# Patient Record
Sex: FEMALE | Race: WHITE | NOT HISPANIC OR LATINO | Employment: UNEMPLOYED | URBAN - METROPOLITAN AREA
[De-identification: names, ages, dates, MRNs, and addresses within clinical notes are randomized per-mention and may not be internally consistent; named-entity substitution may affect disease eponyms.]

---

## 2021-10-11 ENCOUNTER — TELEPHONE (OUTPATIENT)
Dept: RHEUMATOLOGY | Facility: CLINIC | Age: 56
End: 2021-10-11

## 2022-02-14 ENCOUNTER — TELEPHONE (OUTPATIENT)
Dept: RHEUMATOLOGY | Facility: CLINIC | Age: 57
End: 2022-02-14
Payer: COMMERCIAL

## 2022-02-14 NOTE — TELEPHONE ENCOUNTER
Left vm to inform Ms Rodas the 4/25/22 appointment would need to be cancelled due to being booked incorrectly. Left instructions to call [phone] to discuss the need for rheumatology appointment. Pt has no referral and isn't an transfer has far as the current chart information gives. Will wait for patient to call back to reschedule.  Xiomara WALLIS LPN

## 2022-11-01 NOTE — PROGRESS NOTES
"Subjective:       Patient ID: Yoko Rodas is a 57 y.o. female.    Chief Complaint: Abnormal Lab    HPI    This is a 57-year-old woman with history of vitamin-D deficiency, HLD, anxiety, depression, YADIEL on CPAP, GERD, IBS with diarrhea and constipation, rosacea, panic disorder, rotator cuff tendinopathy, carpal tunnel syndrome right, chronic back pain with radicular symptoms (MRI lumbar spine 2022 showed disc bulge, anterolisthesis, and disc herniation) s/p back surgery (10/17/22), and allergy to sulfa drugs who was referred to Rheumatology for positive SHEA (positive centromere and RNP). The patient reports chronic pain in her hands, specifically in bilateral CMC joints, and swelling In her fingers. She notes stiffness lasting a day to a few days. She reports that her arms frequently fall asleep, and that she gets numbness and tingling of her fingers. She reports Raynaud's that she has had  for the past year, dry eyes on Restasis and OTC eye drops, occasional dysphagia, she picks at the skin on her forearms, swelling in both legs, positive FOBT two years ago and has never had a colonoscopy, weakness in the legs worse since the back surgery.    The patient denies fevers, patchy alopecia, oral/nasal ulcers, rashes, photosensitivity, pleuritic chest pain, abdominal pain, diarrhea, sinusitis, ear infections, genital ulcers.     Gyn History:   (3003)   No VTE     Family History:  T2DM  Lung cancer  Lupus (?): mother    Social History:   Former smoker, quit in , used to smoke 1 PPD  Former alcoholic beverage drinker     Objective:   /88   Pulse 87   Ht 5' 4" (1.626 m)   Wt 96.2 kg (212 lb 1.3 oz)   BMI 36.40 kg/m²      Physical Exam   Constitutional: normal appearance.   HENT:   Head: Atraumatic.   Mouth/Throat: Mucous membranes are moist.   Mouth/Throat: No oral ulcers  Cardiovascular: Normal rate, regular rhythm and normal heart sounds.   Pulmonary/Chest: Effort normal and breath sounds " normal.   Musculoskeletal:      Comments: Ambulates with a walker  Swollen joints:  1st to 3rd MCPs bilaterally  Tender joints:  1st to 3rd MCPs bilaterally  Unable to make a full fist with both hands  Strength 5/5 , finger flexors and abductors, proximal and distal upper and lower extremities  Bilateral leg swelling, right > left   Neurological: She is alert.   Skin: Skin is warm and dry. Rash (Sores on bilateral forearms from picking skin) noted.   No psoriasiform rashes, sclerodactyly, telangiectasias, calcinosis      No data to display    Labs reviewed by me:   Positive SHEA without titer, positive centromere, positive RNP  Negative rheumatoid factor, HLA B27  10/2021  CBC:  HGB 11.9, otherwise WNL  CMP WNL  ESR 10  CRP WNL  CPK WNL  UA WNL    Assessment:       1. Inflammatory arthritis    2. Positive SHEA (antinuclear antibody)    3. Raynaud's disease without gangrene    4. Positive fecal occult blood test        This is a 57-year-old woman with history of vitamin-D deficiency, HLD, anxiety, depression, YADIEL on CPAP, GERD, IBS with diarrhea and constipation, rosacea, panic disorder, rotator cuff tendinopathy, carpal tunnel syndrome right, chronic back pain with radicular symptoms (MRI lumbar spine 01/2022 showed disc bulge, anterolisthesis, and disc herniation) s/p back surgery (10/17/22), and allergy to sulfa drugs who was referred to Rheumatology for positive SHEA (positive centromere and RNP). The patient reports pain and swelling in her hands, and stiffness lasting a day to a few days.  She also reports numbness and tingling of her fingers, Raynaud's (with pictures), dry eyes on Restasis and OTC eye drops, occasional dysphagia, swelling in both legs, positive FOBT two years ago and has never had a colonoscopy, weakness in the legs worse since the back surgery.  Physical examination is significant for bilateral 1st to 3rd MCP swelling and tenderness, inability to make a fist with both hands, and swelling of  both lower extremities.  She has evidence of inflammatory arthritis, positive centromere, RNP, and Raynaud's.  Will complete workup as outlined below.  Discussed that she will likely need to start methotrexate.  I gave her information on this medication and will discuss it further at follow-up.    Plan:       Problem List Items Addressed This Visit    None  Visit Diagnoses       Inflammatory arthritis    -  Primary    Relevant Orders    Anti-DNA Ab, Double-Stranded    C3 Complement    C4 Complement    CBC Auto Differential    Comprehensive Metabolic Panel    C-Reactive Protein    Protein/Creatinine Ratio, Urine    Sedimentation rate    Urinalysis    Rheumatoid Factor    Cyclic Citrullinated Peptide Antibody, IgG    DRVVT    Cardiolipin antibody    Beta-2 Glycoprotein Abs (IgA, IgG, IgM)    CK    Aldolase    HIV 1/2 Ag/Ab (4th Gen)    Hepatitis B surface antigen    HBcAB    Hepatitis B surface antibody    Hepatitis C antibody    Quantiferon Gold TB    Direct antiglobulin test    Complement, Total    SHEA Screen w/Reflex    Anti Sm/RNP Antibody    Sjogrens syndrome-A extractable nuclear antibody    ANTI-SSB ANTIBODY    X-Ray Hand Complete Bilateral    Positive SHEA (antinuclear antibody)        Relevant Orders    Anti-DNA Ab, Double-Stranded    C3 Complement    C4 Complement    CBC Auto Differential    Comprehensive Metabolic Panel    C-Reactive Protein    Protein/Creatinine Ratio, Urine    Sedimentation rate    Urinalysis    Rheumatoid Factor    Cyclic Citrullinated Peptide Antibody, IgG    DRVVT    Cardiolipin antibody    Beta-2 Glycoprotein Abs (IgA, IgG, IgM)    CK    Aldolase    HIV 1/2 Ag/Ab (4th Gen)    Hepatitis B surface antigen    HBcAB    Hepatitis B surface antibody    Hepatitis C antibody    Quantiferon Gold TB    Direct antiglobulin test    Complement, Total    SHEA Screen w/Reflex    Anti Sm/RNP Antibody    Sjogrens syndrome-A extractable nuclear antibody    ANTI-SSB ANTIBODY    Raynaud's disease without  gangrene        Relevant Orders    Anti-DNA Ab, Double-Stranded    C3 Complement    C4 Complement    CBC Auto Differential    Comprehensive Metabolic Panel    C-Reactive Protein    Protein/Creatinine Ratio, Urine    Sedimentation rate    Urinalysis    Rheumatoid Factor    Cyclic Citrullinated Peptide Antibody, IgG    DRVVT    Cardiolipin antibody    Beta-2 Glycoprotein Abs (IgA, IgG, IgM)    CK    Aldolase    HIV 1/2 Ag/Ab (4th Gen)    Hepatitis B surface antigen    HBcAB    Hepatitis B surface antibody    Hepatitis C antibody    Quantiferon Gold TB    Direct antiglobulin test    Complement, Total    SHEA Screen w/Reflex    Anti Sm/RNP Antibody    Sjogrens syndrome-A extractable nuclear antibody    ANTI-SSB ANTIBODY    Positive fecal occult blood test              - lab workup as outlined above  - x-rays of both hands  - discussed central heat preservation as conservative management for Raynaud's; may need to try CCB in the future  - ACR patient information sheet on methotrexate was provided  - patient needs a colonoscopy.  She had a positive FOBT 2 years ago that has never been evaluated.    Follow up in 3-4 weeks to discuss management    60 minutes of total time spent on the encounter, which includes face to face time and non-face to face time preparing to see the patient (eg, review of tests), Obtaining and/or reviewing separately obtained history, Documenting clinical information in the electronic or other health record, Independently interpreting results (not separately reported) and communicating results to the patient/family/caregiver, or Care coordination (not separately reported).       Karena Tapia M.D.  Rheumatology Dept  [place], [place]

## 2022-11-02 ENCOUNTER — OFFICE VISIT (OUTPATIENT)
Dept: RHEUMATOLOGY | Facility: CLINIC | Age: 57
End: 2022-11-02
Payer: MEDICAID

## 2022-11-02 ENCOUNTER — HOSPITAL ENCOUNTER (OUTPATIENT)
Dept: RADIOLOGY | Facility: HOSPITAL | Age: 57
Discharge: HOME | End: 2022-11-02
Attending: STUDENT IN AN ORGANIZED HEALTH CARE EDUCATION/TRAINING PROGRAM
Payer: MEDICAID

## 2022-11-02 VITALS
BODY MASS INDEX: 36.2 KG/M2 | SYSTOLIC BLOOD PRESSURE: 137 MMHG | HEIGHT: 64 IN | HEART RATE: 87 BPM | WEIGHT: 212.06 LBS | DIASTOLIC BLOOD PRESSURE: 88 MMHG

## 2022-11-02 DIAGNOSIS — R76.8 POSITIVE ANA (ANTINUCLEAR ANTIBODY): ICD-10-CM

## 2022-11-02 DIAGNOSIS — I73.00 RAYNAUD'S DISEASE WITHOUT GANGRENE: ICD-10-CM

## 2022-11-02 DIAGNOSIS — R19.5 POSITIVE FECAL OCCULT BLOOD TEST: ICD-10-CM

## 2022-11-02 DIAGNOSIS — M19.90 INFLAMMATORY ARTHRITIS: ICD-10-CM

## 2022-11-02 DIAGNOSIS — M19.90 INFLAMMATORY ARTHRITIS: Primary | ICD-10-CM

## 2022-11-02 PROCEDURE — 99999 PR PBB SHADOW E&M-EST. PATIENT-LVL IV: ICD-10-PCS | Mod: PBBFAC,,, | Performed by: STUDENT IN AN ORGANIZED HEALTH CARE EDUCATION/TRAINING PROGRAM

## 2022-11-02 PROCEDURE — 3079F PR MOST RECENT DIASTOLIC BLOOD PRESSURE 80-89 MM HG: ICD-10-PCS | Mod: CPTII,,, | Performed by: STUDENT IN AN ORGANIZED HEALTH CARE EDUCATION/TRAINING PROGRAM

## 2022-11-02 PROCEDURE — 3079F DIAST BP 80-89 MM HG: CPT | Mod: CPTII,,, | Performed by: STUDENT IN AN ORGANIZED HEALTH CARE EDUCATION/TRAINING PROGRAM

## 2022-11-02 PROCEDURE — 99214 OFFICE O/P EST MOD 30 MIN: CPT | Mod: PBBFAC,PN | Performed by: STUDENT IN AN ORGANIZED HEALTH CARE EDUCATION/TRAINING PROGRAM

## 2022-11-02 PROCEDURE — 3075F SYST BP GE 130 - 139MM HG: CPT | Mod: CPTII,,, | Performed by: STUDENT IN AN ORGANIZED HEALTH CARE EDUCATION/TRAINING PROGRAM

## 2022-11-02 PROCEDURE — 73130 XR HAND COMPLETE 3 VIEWS BILATERAL: ICD-10-PCS | Mod: 26,50,, | Performed by: RADIOLOGY

## 2022-11-02 PROCEDURE — 73130 X-RAY EXAM OF HAND: CPT | Mod: TC,50,FY,PO

## 2022-11-02 PROCEDURE — 99205 OFFICE O/P NEW HI 60 MIN: CPT | Mod: S$PBB,,, | Performed by: STUDENT IN AN ORGANIZED HEALTH CARE EDUCATION/TRAINING PROGRAM

## 2022-11-02 PROCEDURE — 1159F MED LIST DOCD IN RCRD: CPT | Mod: CPTII,,, | Performed by: STUDENT IN AN ORGANIZED HEALTH CARE EDUCATION/TRAINING PROGRAM

## 2022-11-02 PROCEDURE — 73130 X-RAY EXAM OF HAND: CPT | Mod: 26,50,, | Performed by: RADIOLOGY

## 2022-11-02 PROCEDURE — 3008F BODY MASS INDEX DOCD: CPT | Mod: CPTII,,, | Performed by: STUDENT IN AN ORGANIZED HEALTH CARE EDUCATION/TRAINING PROGRAM

## 2022-11-02 PROCEDURE — 3075F PR MOST RECENT SYSTOLIC BLOOD PRESS GE 130-139MM HG: ICD-10-PCS | Mod: CPTII,,, | Performed by: STUDENT IN AN ORGANIZED HEALTH CARE EDUCATION/TRAINING PROGRAM

## 2022-11-02 PROCEDURE — 1159F PR MEDICATION LIST DOCUMENTED IN MEDICAL RECORD: ICD-10-PCS | Mod: CPTII,,, | Performed by: STUDENT IN AN ORGANIZED HEALTH CARE EDUCATION/TRAINING PROGRAM

## 2022-11-02 PROCEDURE — 99205 PR OFFICE/OUTPT VISIT, NEW, LEVL V, 60-74 MIN: ICD-10-PCS | Mod: S$PBB,,, | Performed by: STUDENT IN AN ORGANIZED HEALTH CARE EDUCATION/TRAINING PROGRAM

## 2022-11-02 PROCEDURE — 86880 COOMBS TEST DIRECT: CPT | Performed by: STUDENT IN AN ORGANIZED HEALTH CARE EDUCATION/TRAINING PROGRAM

## 2022-11-02 PROCEDURE — 99999 PR PBB SHADOW E&M-EST. PATIENT-LVL IV: CPT | Mod: PBBFAC,,, | Performed by: STUDENT IN AN ORGANIZED HEALTH CARE EDUCATION/TRAINING PROGRAM

## 2022-11-02 PROCEDURE — 3008F PR BODY MASS INDEX (BMI) DOCUMENTED: ICD-10-PCS | Mod: CPTII,,, | Performed by: STUDENT IN AN ORGANIZED HEALTH CARE EDUCATION/TRAINING PROGRAM

## 2022-11-02 RX ORDER — CYCLOSPORINE 0.5 MG/ML
1 EMULSION OPHTHALMIC 2 TIMES DAILY
COMMUNITY
Start: 2022-06-24

## 2022-11-02 RX ORDER — FLUTICASONE PROPIONATE 50 MCG
1 SPRAY, SUSPENSION (ML) NASAL
COMMUNITY

## 2022-11-02 RX ORDER — ATORVASTATIN CALCIUM 20 MG/1
20 TABLET, FILM COATED ORAL DAILY
COMMUNITY
Start: 2022-10-14

## 2022-11-02 RX ORDER — GABAPENTIN 400 MG/1
400 CAPSULE ORAL 3 TIMES DAILY
COMMUNITY
Start: 2022-10-14

## 2022-11-02 RX ORDER — CHLORZOXAZONE 500 MG/1
500 TABLET ORAL 2 TIMES DAILY
COMMUNITY
Start: 2022-07-19

## 2022-11-02 RX ORDER — BACLOFEN 10 MG/1
10 TABLET ORAL 3 TIMES DAILY
COMMUNITY
Start: 2022-09-22

## 2022-11-02 RX ORDER — FERROUS SULFATE 325(65) MG
TABLET ORAL
COMMUNITY

## 2022-11-02 RX ORDER — LIDOCAINE 50 MG/G
1 PATCH TOPICAL
COMMUNITY
Start: 2022-07-19

## 2022-11-02 RX ORDER — UMECLIDINIUM BROMIDE AND VILANTEROL TRIFENATATE 62.5; 25 UG/1; UG/1
1 POWDER RESPIRATORY (INHALATION) DAILY
COMMUNITY
Start: 2022-10-27

## 2022-11-02 RX ORDER — TRAMADOL HYDROCHLORIDE 50 MG/1
TABLET ORAL
COMMUNITY
Start: 2022-07-13

## 2022-11-02 RX ORDER — OXYCODONE AND ACETAMINOPHEN 10; 325 MG/1; MG/1
TABLET ORAL DAILY PRN
COMMUNITY
Start: 2022-09-19

## 2022-11-02 RX ORDER — HYDROXYZINE HYDROCHLORIDE 25 MG/1
25 TABLET, FILM COATED ORAL 3 TIMES DAILY
COMMUNITY
Start: 2022-09-30

## 2022-11-02 RX ORDER — DICLOFENAC SODIUM 75 MG/1
75 TABLET, DELAYED RELEASE ORAL 2 TIMES DAILY
COMMUNITY
Start: 2022-10-14

## 2022-11-02 RX ORDER — ACETAMINOPHEN 500 MG
TABLET ORAL
COMMUNITY
Start: 2022-05-13

## 2022-11-02 ASSESSMENT — ROUTINE ASSESSMENT OF PATIENT INDEX DATA (RAPID3)
TOTAL RAPID3 SCORE: 6.05
MDHAQ FUNCTION SCORE: 1.7
PSYCHOLOGICAL DISTRESS SCORE: 5.5
FATIGUE SCORE: 3.3
PAIN SCORE: 7.5
PATIENT GLOBAL ASSESSMENT SCORE: 5

## 2022-11-04 ENCOUNTER — PATIENT MESSAGE (OUTPATIENT)
Dept: RHEUMATOLOGY | Facility: CLINIC | Age: 57
End: 2022-11-04
Payer: MEDICAID

## 2022-11-16 ENCOUNTER — PATIENT MESSAGE (OUTPATIENT)
Dept: RHEUMATOLOGY | Facility: CLINIC | Age: 57
End: 2022-11-16
Payer: MEDICAID

## 2022-12-02 NOTE — PROGRESS NOTES
Subjective:       Patient ID: Yoko Rodas is a 57 y.o. female.    Chief Complaint: Disease Management and Positive SHEA    The patient location is: [place]  The chief complaint leading to consultation is: positive SHEA    Visit type: audiovisual    Face to Face time with patient: 10 minutes  30 minutes of total time spent on the encounter, which includes face to face time and non-face to face time preparing to see the patient (eg, review of tests), Obtaining and/or reviewing separately obtained history, Documenting clinical information in the electronic or other health record, Independently interpreting results (not separately reported) and communicating results to the patient/family/caregiver, or Care coordination (not separately reported).     Each patient to whom he or she provides medical services by telemedicine is:  (1) informed of the relationship between the physician and patient and the respective role of any other health care provider with respect to management of the patient; and (2) notified that he or she may decline to receive medical services by telemedicine and may withdraw from such care at any time.    Notes:     HPI    This is a 57-year-old woman with history of vitamin-D deficiency, HLD, anxiety, depression, YADIEL on CPAP, GERD, IBS with diarrhea and constipation, rosacea, panic disorder, rotator cuff tendinopathy, carpal tunnel syndrome right, chronic back pain with radicular symptoms (MRI lumbar spine 01/2022 showed disc bulge, anterolisthesis, and disc herniation) s/p back surgery (10/17/22), and allergy to sulfa drugs who was referred to Rheumatology for positive SHEA >1:2560 centromere and positive RNP. She was last seen on 11/2/22 and at that time reported pain and swelling in her hands, and stiffness lasting a day to a few days.  She also reported numbness and tingling of her fingers, Raynaud's (with pictures), dry eyes on Restasis and OTC eye drops, occasional dysphagia, swelling in both  legs, positive FOBT two years ago and has never had a colonoscopy, weakness in the legs worse since her back surgery.  Physical examination showed bilateral 1st to 3rd MCP swelling and tenderness, inability to make a fist with both hands, and swelling of both lower extremities.  She had evidence of inflammatory arthritis, positive centromere, RNP, and Raynaud's. Lab work up showed: + SHEA >1:2560 centromere, APA IgM 12.5, negative SSA, SSB, [place]/RNP, DAMION, dsDNA, B2G, DRVVT, RF, CCP; CPK, aldolase, complements, ESR, CRP, CMP, UA, UPC WNL; anemia (Hgb 10.9).      Currently, she reports persistent back and leg pain, intermittent dysphagia, and joint pain in her hands. She only notes shortness of breath when she does not use her CPAP.     Objective:   There were no vitals taken for this visit.     Physical Exam   Constitutional: normal appearance.   HENT:   Head: Normocephalic and atraumatic.   Neurological: She is alert.      No data to display    Pre DMARD labs completed 11/02/2022     Assessment:       1. Undifferentiated connective tissue disease    2. Inflammatory arthritis    3. Positive SHEA (antinuclear antibody)    4. Raynaud's disease without gangrene    5. Long-term use of Plaquenil    6. Dysphagia, unspecified type        This is a 57-year-old woman with history of vitamin-D deficiency, HLD, anxiety, depression, YADIEL on CPAP, GERD, IBS with diarrhea and constipation, rosacea, panic disorder, rotator cuff tendinopathy, carpal tunnel syndrome right, chronic back pain with radicular symptoms (MRI lumbar spine 01/2022 showed disc bulge, anterolisthesis, and disc herniation) s/p back surgery (10/17/22), and allergy to sulfa drugs who was referred to Rheumatology for positive SHEA >1:2560 centromere and positive RNP. She was last seen on 11/2/22 and at that time reported pain and swelling in her hands, and stiffness lasting a day to a few days.  She also reported numbness and tingling of her fingers, Raynaud's (with  pictures), dry eyes on Restasis and OTC eye drops, occasional dysphagia, swelling in both legs, positive FOBT two years ago and has never had a colonoscopy, weakness in the legs worse since her back surgery.  Physical examination showed bilateral 1st to 3rd MCP swelling and tenderness, inability to make a fist with both hands, and swelling of both lower extremities.  She had evidence of inflammatory arthritis, positive centromere, RNP, and Raynaud's. Lab work up showed: + SHEA >1:2560 centromere, APA IgM 12.5, negative SSA, SSB, [place]/RNP, DAMION, dsDNA, B2G, DRVVT, RF, CCP; CPK, aldolase, complements, ESR, CRP, CMP, UA, UPC WNL; anemia (Hgb 10.9).  She does not meet criteria for lupus or systemic sclerosis.  Will have her try Plaquenil for UCTD with features of lupus and SSc for now and monitor response.    Plan:       Problem List Items Addressed This Visit    None  Visit Diagnoses       Undifferentiated connective tissue disease    -  Primary    Inflammatory arthritis        Relevant Medications    hydrOXYchloroQUINE (PLAQUENIL) 200 mg tablet    amLODIPine (NORVASC) 5 MG tablet    Positive SHEA (antinuclear antibody)        Relevant Medications    hydrOXYchloroQUINE (PLAQUENIL) 200 mg tablet    amLODIPine (NORVASC) 5 MG tablet    Raynaud's disease without gangrene        Relevant Medications    hydrOXYchloroQUINE (PLAQUENIL) 200 mg tablet    amLODIPine (NORVASC) 5 MG tablet    Long-term use of Plaquenil        Relevant Orders    Ambulatory referral/consult to Optometry    Dysphagia, unspecified type        Relevant Orders    FL Esophagram Complete          - Trial of plaquenil 400mg daily  - Raynaud's: amlodipine 5mg daily   - Refer to Optometry for Plaquenil eye exam   - Dysphagia: Barium swallow  - RNA polymerase III, Th/to at follow up  - Refer to Optometry for Plaquenil eye exam   - Avoid sulfasalazine given sulfa allergy  - Pre-DMARD labs completed 11/02/2022     Follow up in 3 months    30 minutes of total time  spent on the encounter, which includes face to face time and non-face to face time preparing to see the patient (eg, review of tests), Obtaining and/or reviewing separately obtained history, Documenting clinical information in the electronic or other health record, Independently interpreting results (not separately reported) and communicating results to the patient/family/caregiver, or Care coordination (not separately reported).       Karena Tapia M.D.  Rheumatology Dept  [place], [place]

## 2022-12-05 ENCOUNTER — PATIENT MESSAGE (OUTPATIENT)
Dept: RHEUMATOLOGY | Facility: CLINIC | Age: 57
End: 2022-12-05

## 2022-12-05 ENCOUNTER — OFFICE VISIT (OUTPATIENT)
Dept: RHEUMATOLOGY | Facility: CLINIC | Age: 57
End: 2022-12-05
Payer: MEDICAID

## 2022-12-05 DIAGNOSIS — M35.9 UNDIFFERENTIATED CONNECTIVE TISSUE DISEASE: Primary | ICD-10-CM

## 2022-12-05 DIAGNOSIS — R13.10 DYSPHAGIA, UNSPECIFIED TYPE: ICD-10-CM

## 2022-12-05 DIAGNOSIS — I73.00 RAYNAUD'S DISEASE WITHOUT GANGRENE: ICD-10-CM

## 2022-12-05 DIAGNOSIS — M19.90 INFLAMMATORY ARTHRITIS: ICD-10-CM

## 2022-12-05 DIAGNOSIS — R76.8 POSITIVE ANA (ANTINUCLEAR ANTIBODY): ICD-10-CM

## 2022-12-05 DIAGNOSIS — Z79.899 LONG-TERM USE OF PLAQUENIL: ICD-10-CM

## 2022-12-05 PROCEDURE — 99214 OFFICE O/P EST MOD 30 MIN: CPT | Mod: 95,,, | Performed by: STUDENT IN AN ORGANIZED HEALTH CARE EDUCATION/TRAINING PROGRAM

## 2022-12-05 PROCEDURE — 99214 PR OFFICE/OUTPT VISIT, EST, LEVL IV, 30-39 MIN: ICD-10-PCS | Mod: 95,,, | Performed by: STUDENT IN AN ORGANIZED HEALTH CARE EDUCATION/TRAINING PROGRAM

## 2022-12-05 RX ORDER — HYDROXYCHLOROQUINE SULFATE 200 MG/1
400 TABLET, FILM COATED ORAL DAILY
Qty: 180 TABLET | Refills: 1 | Status: SHIPPED | OUTPATIENT
Start: 2022-12-05 | End: 2023-03-05

## 2022-12-05 RX ORDER — AMLODIPINE BESYLATE 5 MG/1
5 TABLET ORAL DAILY
Qty: 90 TABLET | Refills: 1 | Status: SHIPPED | OUTPATIENT
Start: 2022-12-05 | End: 2022-12-16

## 2022-12-07 ENCOUNTER — PATIENT MESSAGE (OUTPATIENT)
Dept: RHEUMATOLOGY | Facility: CLINIC | Age: 57
End: 2022-12-07
Payer: MEDICAID

## 2022-12-08 ENCOUNTER — PATIENT MESSAGE (OUTPATIENT)
Dept: RHEUMATOLOGY | Facility: CLINIC | Age: 57
End: 2022-12-08
Payer: MEDICAID

## 2022-12-12 ENCOUNTER — PATIENT MESSAGE (OUTPATIENT)
Dept: RHEUMATOLOGY | Facility: CLINIC | Age: 57
End: 2022-12-12
Payer: MEDICAID

## 2022-12-16 DIAGNOSIS — I73.00 RAYNAUD'S DISEASE WITHOUT GANGRENE: Primary | ICD-10-CM

## 2022-12-16 RX ORDER — AMLODIPINE BESYLATE 2.5 MG/1
1.25 TABLET ORAL DAILY
Qty: 45 TABLET | Refills: 1 | Status: SHIPPED | OUTPATIENT
Start: 2022-12-16 | End: 2023-06-14

## 2023-02-23 ENCOUNTER — TELEPHONE (OUTPATIENT)
Dept: RHEUMATOLOGY | Facility: CLINIC | Age: 58
End: 2023-02-23
Payer: MEDICAID

## 2023-02-23 NOTE — TELEPHONE ENCOUNTER
----- Message from Graciela Villegas sent at 2/23/2023  1:07 PM CST -----  Contact: called at [phone]  Type: Needs Medical Advice  Who Called:  Ms Arron Chandler NP from [facility]  Symptoms (please be specific):  swollen legs  How long has patient had these symptoms:  2 months  Best Call Back Number: [phone]  Additional Information: They are calling because the med (amlodipine) is causing the pt's legs to swell. They would like to know is there another med the pt can take? Please call back and advise.

## 2023-02-23 NOTE — TELEPHONE ENCOUNTER
Returned call to  Arron Chandler NP left message with her nurse and call back number  Spoke to Ms Rodas and informed her Dr Guy advises to hold the amlodipine and see it leg swelling decreases. If it does another medication will need to be used. Patient voiced understanding and will update rheumatology in a couple days per her.

## 2023-02-24 ENCOUNTER — PATIENT MESSAGE (OUTPATIENT)
Dept: RHEUMATOLOGY | Facility: CLINIC | Age: 58
End: 2023-02-24
Payer: MEDICAID

## 2023-04-25 NOTE — PROGRESS NOTES
"Subjective:      Patient ID: Yoko Rodas is a 57 y.o. female.    Chief Complaint: Disease Management    HPI    This is a 57-year-old woman with history of vitamin-D deficiency, HLD, anxiety, depression, YADIEL on CPAP, GERD, IBS with diarrhea and constipation, rosacea, panic disorder, rotator cuff tendinopathy, carpal tunnel syndrome right, chronic back pain with radicular symptoms (MRI lumbar spine 01/2022 showed disc bulge, anterolisthesis, and disc herniation) s/p back surgery (10/17/22), sulfa allergy, and UCTD characterized by positive SHEA >1:2560 centromere, APA IgM 12.5, positive RNP, inflammatory arthritis, Raynaud's (with pictures), sicca symptoms, occasional dysphagia. Negative SSA, SSB, [place]/RNP, DAMION, dsDNA, B2G, DRVVT, RF, CCP; CPK, aldolase, complements, ESR, CRP, CMP, UA, UPC WNL; anemia (Hgb 10.9).  She does not meet criteria for lupus or systemic sclerosis.  She was given Plaquenil and has been on it since then. She has widespread pain and fatigue. At the end of March, she fell and injured her right shin. Since then, she has had significant pain, warmth, and swelling in her right calf. She saw her PCP recently who gave her antibiotics but she has not noticed improvement. She thinks that she had an ultrasound but I cannot see any ultrasound results on care everywhere.     Objective:   /82   Pulse 97   Ht 5' 4" (1.626 m)   Wt 96.3 kg (212 lb 4.9 oz)   BMI 36.44 kg/m²   Physical Exam   Constitutional: normal appearance.   HENT:   Head: Normocephalic and atraumatic.   Mouth/Throat: Mucous membranes are moist.   Mouth/Throat: No oral ulcers  Musculoskeletal:      Comments: Swollen joints:  1st to 3rd MCPs bilaterally  Tender joints:  1st to 3rd MCPs bilaterally  Unable to make a full fist with both hands  Strength 5/5 , finger flexors and abductors, proximal and distal upper and lower extremities  Bilateral leg swelling, right > left   Neurological: She is alert.   Skin: Skin is warm " and dry. Rash (Sores on bilateral forearms from picking skin) noted.   No psoriasiform rashes, sclerodactyly, telangiectasias, calcinosis  Excoriations, erythema, warmth, TTP over right shin/calf     No data to display     Assessment:     1. Right calf pain    2. Inflammatory arthritis    3. Raynaud's disease without gangrene    4. Positive SHEA (antinuclear antibody)    5. Long-term use of Plaquenil    6. Undifferentiated connective tissue disease    7. Dysphagia, unspecified type      This is a 57-year-old woman with history of vitamin-D deficiency, HLD, anxiety, depression, YADIEL on CPAP, GERD, IBS with diarrhea and constipation, rosacea, panic disorder, rotator cuff tendinopathy, carpal tunnel syndrome right, chronic back pain with radicular symptoms (MRI lumbar spine 01/2022 showed disc bulge, anterolisthesis, and disc herniation) s/p back surgery (10/17/22), sulfa allergy, and UCTD characterized by positive SHEA >1:2560 centromere, APA IgM 12.5, positive RNP, inflammatory arthritis, Raynaud's (with pictures), sicca symptoms, occasional dysphagia. Negative SSA, SSB, [place]/RNP, DAMION, dsDNA, B2G, DRVVT, RF, CCP; CPK, aldolase, complements, ESR, CRP, CMP, UA, UPC WNL; anemia (Hgb 10.9).  She does not meet criteria for lupus or systemic sclerosis.  She was given Plaquenil and has been on it since then. She has widespread pain and fatigue. At the end of March, she fell and injured her right shin. Since then, she has had significant pain, warmth, and swelling in her right calf. She saw her PCP recently who gave her antibiotics but she has not noticed improvement. She thinks that she had an ultrasound but I cannot see any ultrasound results on care everywhere.     Her right shin/calf is erythematous, warm, tender, and significantly more swollen than the left. I am having difficulty palpating her dorsalis pedis. I am concerned about a DVT vs a more serious SSTI that may require IV antibiotics. I have advised her to go to  the ER.     Plan:     Problem List Items Addressed This Visit    None  Visit Diagnoses       Right calf pain    -  Primary    Inflammatory arthritis        Raynaud's disease without gangrene        Positive SHEA (antinuclear antibody)        Long-term use of Plaquenil        Undifferentiated connective tissue disease        Dysphagia, unspecified type              - plaquenil 400mg daily  - Raynaud's: amlodipine 5mg daily   - Dysphagia: Barium swallow  - RNA polymerase III, Th/to at follow up  - to Optometry for Plaquenil eye exam   - Avoid sulfasalazine given sulfa allergy  - Pre-DMARD labs completed 11/02/2022     Follow up in 4-6 weeks    30 minutes of total time spent on the encounter, which includes face to face time and non-face to face time preparing to see the patient (eg, review of tests), Obtaining and/or reviewing separately obtained history, Documenting clinical information in the electronic or other health record, Independently interpreting results (not separately reported) and communicating results to the patient/family/caregiver, or Care coordination (not separately reported).       Karena Tapia M.D.  Rheumatology Dept  [place], [place]

## 2023-04-26 ENCOUNTER — OFFICE VISIT (OUTPATIENT)
Dept: RHEUMATOLOGY | Facility: CLINIC | Age: 58
End: 2023-04-26
Payer: MEDICAID

## 2023-04-26 VITALS
DIASTOLIC BLOOD PRESSURE: 82 MMHG | HEIGHT: 64 IN | WEIGHT: 212.31 LBS | BODY MASS INDEX: 36.25 KG/M2 | HEART RATE: 97 BPM | SYSTOLIC BLOOD PRESSURE: 115 MMHG

## 2023-04-26 DIAGNOSIS — Z79.899 LONG-TERM USE OF PLAQUENIL: ICD-10-CM

## 2023-04-26 DIAGNOSIS — I73.00 RAYNAUD'S DISEASE WITHOUT GANGRENE: ICD-10-CM

## 2023-04-26 DIAGNOSIS — M19.90 INFLAMMATORY ARTHRITIS: ICD-10-CM

## 2023-04-26 DIAGNOSIS — R13.10 DYSPHAGIA, UNSPECIFIED TYPE: ICD-10-CM

## 2023-04-26 DIAGNOSIS — M35.9 UNDIFFERENTIATED CONNECTIVE TISSUE DISEASE: ICD-10-CM

## 2023-04-26 DIAGNOSIS — M79.661 RIGHT CALF PAIN: Primary | ICD-10-CM

## 2023-04-26 DIAGNOSIS — R76.8 POSITIVE ANA (ANTINUCLEAR ANTIBODY): ICD-10-CM

## 2023-04-26 PROCEDURE — 3008F BODY MASS INDEX DOCD: CPT | Mod: CPTII,,, | Performed by: STUDENT IN AN ORGANIZED HEALTH CARE EDUCATION/TRAINING PROGRAM

## 2023-04-26 PROCEDURE — 99215 PR OFFICE/OUTPT VISIT, EST, LEVL V, 40-54 MIN: ICD-10-PCS | Mod: S$PBB,,, | Performed by: STUDENT IN AN ORGANIZED HEALTH CARE EDUCATION/TRAINING PROGRAM

## 2023-04-26 PROCEDURE — 3079F DIAST BP 80-89 MM HG: CPT | Mod: CPTII,,, | Performed by: STUDENT IN AN ORGANIZED HEALTH CARE EDUCATION/TRAINING PROGRAM

## 2023-04-26 PROCEDURE — 99999 PR PBB SHADOW E&M-EST. PATIENT-LVL IV: ICD-10-PCS | Mod: PBBFAC,,, | Performed by: STUDENT IN AN ORGANIZED HEALTH CARE EDUCATION/TRAINING PROGRAM

## 2023-04-26 PROCEDURE — 99999 PR PBB SHADOW E&M-EST. PATIENT-LVL IV: CPT | Mod: PBBFAC,,, | Performed by: STUDENT IN AN ORGANIZED HEALTH CARE EDUCATION/TRAINING PROGRAM

## 2023-04-26 PROCEDURE — 1160F RVW MEDS BY RX/DR IN RCRD: CPT | Mod: CPTII,,, | Performed by: STUDENT IN AN ORGANIZED HEALTH CARE EDUCATION/TRAINING PROGRAM

## 2023-04-26 PROCEDURE — 1159F MED LIST DOCD IN RCRD: CPT | Mod: CPTII,,, | Performed by: STUDENT IN AN ORGANIZED HEALTH CARE EDUCATION/TRAINING PROGRAM

## 2023-04-26 PROCEDURE — 1159F PR MEDICATION LIST DOCUMENTED IN MEDICAL RECORD: ICD-10-PCS | Mod: CPTII,,, | Performed by: STUDENT IN AN ORGANIZED HEALTH CARE EDUCATION/TRAINING PROGRAM

## 2023-04-26 PROCEDURE — 3074F PR MOST RECENT SYSTOLIC BLOOD PRESSURE < 130 MM HG: ICD-10-PCS | Mod: CPTII,,, | Performed by: STUDENT IN AN ORGANIZED HEALTH CARE EDUCATION/TRAINING PROGRAM

## 2023-04-26 PROCEDURE — 3008F PR BODY MASS INDEX (BMI) DOCUMENTED: ICD-10-PCS | Mod: CPTII,,, | Performed by: STUDENT IN AN ORGANIZED HEALTH CARE EDUCATION/TRAINING PROGRAM

## 2023-04-26 PROCEDURE — 99214 OFFICE O/P EST MOD 30 MIN: CPT | Mod: PBBFAC,PN | Performed by: STUDENT IN AN ORGANIZED HEALTH CARE EDUCATION/TRAINING PROGRAM

## 2023-04-26 PROCEDURE — 1160F PR REVIEW ALL MEDS BY PRESCRIBER/CLIN PHARMACIST DOCUMENTED: ICD-10-PCS | Mod: CPTII,,, | Performed by: STUDENT IN AN ORGANIZED HEALTH CARE EDUCATION/TRAINING PROGRAM

## 2023-04-26 PROCEDURE — 99215 OFFICE O/P EST HI 40 MIN: CPT | Mod: S$PBB,,, | Performed by: STUDENT IN AN ORGANIZED HEALTH CARE EDUCATION/TRAINING PROGRAM

## 2023-04-26 PROCEDURE — 3079F PR MOST RECENT DIASTOLIC BLOOD PRESSURE 80-89 MM HG: ICD-10-PCS | Mod: CPTII,,, | Performed by: STUDENT IN AN ORGANIZED HEALTH CARE EDUCATION/TRAINING PROGRAM

## 2023-04-26 PROCEDURE — 3074F SYST BP LT 130 MM HG: CPT | Mod: CPTII,,, | Performed by: STUDENT IN AN ORGANIZED HEALTH CARE EDUCATION/TRAINING PROGRAM

## 2023-04-26 ASSESSMENT — ROUTINE ASSESSMENT OF PATIENT INDEX DATA (RAPID3)
PSYCHOLOGICAL DISTRESS SCORE: 2.2
TOTAL RAPID3 SCORE: 5.55
PAIN SCORE: 4.5
FATIGUE SCORE: 1.1
PATIENT GLOBAL ASSESSMENT SCORE: 7.5
MDHAQ FUNCTION SCORE: 1.4

## 2023-04-26 NOTE — LETTER
[facility] Rheumatology  [address]  [place] [address]  Phone: [phone]  Fax: [phone]     4/26/23    To the ER physician on call,     This is a 57-year-old woman with history significant for HLD, YADIEL on CPAP, chronic back pain with radicular symptoms (MRI lumbar spine 01/2022 showed disc bulge, anterolisthesis, and disc herniation) s/p back surgery (10/17/22), sulfa allergy, and UCTD characterized by positive SHEA >1:2560 centromere, APA IgM 12.5, positive RNP, inflammatory arthritis, Raynaud's, sicca symptoms, and occasional dysphagia currently on Plaquenil. (Rheum work up: Negative SSA, SSB, [place]/RNP, DAMION, dsDNA, B2G, DRVVT, RF, CCP; CPK, aldolase, complements, ESR, CRP, CMP, UA, UPC WNL; anemia (Hgb 10.9)). At the end of March, she fell and injured her right shin. Since then, she has had significant pain, warmth, and swelling in her right calf. She saw her PCP recently who gave her oral antibiotics but she has not noticed improvement. She thinks that she had an ultrasound but I cannot see any ultrasound results on care everywhere.     Her right shin/calf is erythematous, warm, tender, and significantly more swollen than the left. I had difficulty palpating her dorsalis pedis. I am concerned about a DVT vs a more serious SSTI that may require IV antibiotics. I have advised her to go to the ER. Please work her up accordingly.     Sincerely,        Karena Tapia M.D.  Rheumatology Dept  [place], [place]

## 2023-04-28 ENCOUNTER — PATIENT MESSAGE (OUTPATIENT)
Dept: RHEUMATOLOGY | Facility: CLINIC | Age: 58
End: 2023-04-28
Payer: MEDICAID

## 2023-05-01 ENCOUNTER — PATIENT MESSAGE (OUTPATIENT)
Dept: RHEUMATOLOGY | Facility: CLINIC | Age: 58
End: 2023-05-01
Payer: MEDICAID

## 2023-06-12 NOTE — PROGRESS NOTES
Subjective:      Patient ID: Yoko Rodas is a 57 y.o. female.    Chief Complaint: Disease Management    The patient location is: [place]  The chief complaint leading to consultation is: UCTD    Visit type: audiovisual    Face to Face time with patient: 15 minutes  30 minutes of total time spent on the encounter, which includes face to face time and non-face to face time preparing to see the patient (eg, review of tests), Obtaining and/or reviewing separately obtained history, Documenting clinical information in the electronic or other health record, Independently interpreting results (not separately reported) and communicating results to the patient/family/caregiver, or Care coordination (not separately reported).     Each patient to whom he or she provides medical services by telemedicine is:  (1) informed of the relationship between the physician and patient and the respective role of any other health care provider with respect to management of the patient; and (2) notified that he or she may decline to receive medical services by telemedicine and may withdraw from such care at any time.    Notes:     HPI    Rheumatologic History:     - Diagnosis/es:   - UCTD with SSc features characterized by positive SHEA >1:2560 centromere, APA IgM 12.5, positive RNP (repeat negative), inflammatory arthritis, Raynaud's (with pictures), sicca symptoms, occasional dysphagia. Negative SSA, SSB, [place]/RNP, DAMION, dsDNA, B2G, DRVVT, RF, CCP  - Positive serologies: +SHEA >1:2560 centromere, +APA IgM 12.5, +RNP  - Negative serologies: SSA, SSB, [place]/RNP, DAMION, dsDNA, B2G, DRVVT, RF, CCP  - Infectious screening labs: Negative hepatitis B, C, and quantiferon (11/2022)  - Imaging:   - Xray hand (11/2022): severe degenerative OA, left CMC   - TTE (1/2023): PASP not indicated, otherwise normal  - Previous Treatments: -  - Current Treatments:    - Inflammatory arthritis: Plaquenil 400mg daily   - Raynaud's: amlodipine 5mg daily  - GERD:  ranitidine 150mg BID  - Plaquenil eye exam: No HCQ toxicity (5/2023)  Interval History:   She was last seen in 4/2023 and at that time right shin/calf was erythematous, warm, tender, and significantly more swollen than the left. I advised her to go to the ER. She was given antibiotics and Lasix and has had significant improvement since then. Currently, she reports some pain at the base of her left CMC. She denies joint swelling, shortness of breath and dysphagia.  Raynaud's is adequately controlled when she stays out of the cold and she denies digital pitting.  GERD is adequately controlled when she takes her ranitidine.    Objective:   There were no vitals taken for this visit.  Physical Exam    No data to display     Assessment:     1. Raynaud's disease without gangrene    2. Inflammatory arthritis    3. Positive SHEA (antinuclear antibody)    4. Long-term use of Plaquenil    5. Undifferentiated connective tissue disease      This is a 57-year-old woman with history of vitamin-D deficiency, HLD, anxiety, depression, YADIEL on CPAP, GERD, IBS with diarrhea and constipation, rosacea, panic disorder, rotator cuff tendinopathy, carpal tunnel syndrome right, chronic back pain with radicular symptoms (MRI lumbar spine 01/2022 showed disc bulge, anterolisthesis, and disc herniation) s/p back surgery (10/17/22), sulfa allergy, and UCTD characterized by positive SHEA >1:2560 centromere, APA IgM 12.5, positive RNP, inflammatory arthritis, Raynaud's (with pictures), sicca symptoms, occasional dysphagia. Negative SSA, SSB, [place]/RNP, DAMION, dsDNA, B2G, DRVVT, RF, CCP; CPK, aldolase, complements, ESR, CRP, CMP, UA, UPC WNL; anemia (Hgb 10.9).  She does not meet criteria for lupus or systemic sclerosis at this time. Will continue current medication as is.     Plan:     Problem List Items Addressed This Visit    None  Visit Diagnoses       Raynaud's disease without gangrene    -  Primary    Relevant Medications    amLODIPine (NORVASC)  5 MG tablet    Other Relevant Orders    RNA polymerase III Ab, IgG    Th/To Antibody    Anti-DNA Ab, Double-Stranded    C3 Complement    C4 Complement    CBC Auto Differential    Comprehensive Metabolic Panel    C-Reactive Protein    Protein/Creatinine Ratio, Urine    Sedimentation rate    Urinalysis    Inflammatory arthritis        Relevant Orders    RNA polymerase III Ab, IgG    Th/To Antibody    Anti-DNA Ab, Double-Stranded    C3 Complement    C4 Complement    CBC Auto Differential    Comprehensive Metabolic Panel    C-Reactive Protein    Protein/Creatinine Ratio, Urine    Sedimentation rate    Urinalysis    Positive SHEA (antinuclear antibody)        Relevant Orders    RNA polymerase III Ab, IgG    Th/To Antibody    Anti-DNA Ab, Double-Stranded    C3 Complement    C4 Complement    CBC Auto Differential    Comprehensive Metabolic Panel    C-Reactive Protein    Protein/Creatinine Ratio, Urine    Sedimentation rate    Urinalysis    Long-term use of Plaquenil        Relevant Orders    RNA polymerase III Ab, IgG    Th/To Antibody    Anti-DNA Ab, Double-Stranded    C3 Complement    C4 Complement    CBC Auto Differential    Comprehensive Metabolic Panel    C-Reactive Protein    Protein/Creatinine Ratio, Urine    Sedimentation rate    Urinalysis    Undifferentiated connective tissue disease        Relevant Orders    RNA polymerase III Ab, IgG    Th/To Antibody    Anti-DNA Ab, Double-Stranded    C3 Complement    C4 Complement    CBC Auto Differential    Comprehensive Metabolic Panel    C-Reactive Protein    Protein/Creatinine Ratio, Urine    Sedimentation rate    Urinalysis          - plaquenil 400mg daily  - Avoid sulfasalazine given sulfa allergy  - Raynaud's: amlodipine 5mg daily   - GERD: ranitidine 150mg BID  - Dysphagia: denies currently, barium swallow deferred  - RNA polymerase III, Th/to at follow up  - Plaquenil eye exam due May 2024  - Pre-DMARD labs due for repeat 11/2023    Follow up in 3 months    30 minutes  of total time spent on the encounter, which includes face to face time and non-face to face time preparing to see the patient (eg, review of tests), Obtaining and/or reviewing separately obtained history, Documenting clinical information in the electronic or other health record, Independently interpreting results (not separately reported) and communicating results to the patient/family/caregiver, or Care coordination (not separately reported).       Karena Tapia M.D.  Rheumatology Dept  [place], [place]

## 2023-06-14 ENCOUNTER — OFFICE VISIT (OUTPATIENT)
Dept: RHEUMATOLOGY | Facility: CLINIC | Age: 58
End: 2023-06-14
Payer: MEDICAID

## 2023-06-14 DIAGNOSIS — M19.90 INFLAMMATORY ARTHRITIS: ICD-10-CM

## 2023-06-14 DIAGNOSIS — R76.8 POSITIVE ANA (ANTINUCLEAR ANTIBODY): ICD-10-CM

## 2023-06-14 DIAGNOSIS — M35.9 UNDIFFERENTIATED CONNECTIVE TISSUE DISEASE: ICD-10-CM

## 2023-06-14 DIAGNOSIS — Z79.899 LONG-TERM USE OF PLAQUENIL: ICD-10-CM

## 2023-06-14 DIAGNOSIS — I73.00 RAYNAUD'S DISEASE WITHOUT GANGRENE: Primary | ICD-10-CM

## 2023-06-14 PROCEDURE — 1160F RVW MEDS BY RX/DR IN RCRD: CPT | Mod: CPTII,95,, | Performed by: STUDENT IN AN ORGANIZED HEALTH CARE EDUCATION/TRAINING PROGRAM

## 2023-06-14 PROCEDURE — 1160F PR REVIEW ALL MEDS BY PRESCRIBER/CLIN PHARMACIST DOCUMENTED: ICD-10-PCS | Mod: CPTII,95,, | Performed by: STUDENT IN AN ORGANIZED HEALTH CARE EDUCATION/TRAINING PROGRAM

## 2023-06-14 PROCEDURE — 99215 OFFICE O/P EST HI 40 MIN: CPT | Mod: 95,,, | Performed by: STUDENT IN AN ORGANIZED HEALTH CARE EDUCATION/TRAINING PROGRAM

## 2023-06-14 PROCEDURE — 1159F PR MEDICATION LIST DOCUMENTED IN MEDICAL RECORD: ICD-10-PCS | Mod: CPTII,95,, | Performed by: STUDENT IN AN ORGANIZED HEALTH CARE EDUCATION/TRAINING PROGRAM

## 2023-06-14 PROCEDURE — 99215 PR OFFICE/OUTPT VISIT, EST, LEVL V, 40-54 MIN: ICD-10-PCS | Mod: 95,,, | Performed by: STUDENT IN AN ORGANIZED HEALTH CARE EDUCATION/TRAINING PROGRAM

## 2023-06-14 PROCEDURE — 1159F MED LIST DOCD IN RCRD: CPT | Mod: CPTII,95,, | Performed by: STUDENT IN AN ORGANIZED HEALTH CARE EDUCATION/TRAINING PROGRAM

## 2023-06-14 RX ORDER — AMLODIPINE BESYLATE 5 MG/1
5 TABLET ORAL DAILY
Qty: 90 TABLET | Refills: 1 | Status: SHIPPED | OUTPATIENT
Start: 2023-06-14 | End: 2023-12-01 | Stop reason: SDUPTHER

## 2023-06-20 ENCOUNTER — PATIENT MESSAGE (OUTPATIENT)
Dept: RHEUMATOLOGY | Facility: CLINIC | Age: 58
End: 2023-06-20
Payer: MEDICAID

## 2023-06-21 RX ORDER — HYDROXYCHLOROQUINE SULFATE 200 MG/1
400 TABLET, FILM COATED ORAL DAILY
Qty: 180 TABLET | Refills: 2 | Status: SHIPPED | OUTPATIENT
Start: 2023-06-21 | End: 2024-02-18

## 2023-07-13 ENCOUNTER — PATIENT MESSAGE (OUTPATIENT)
Dept: RHEUMATOLOGY | Facility: CLINIC | Age: 58
End: 2023-07-13
Payer: MEDICAID

## 2023-12-01 DIAGNOSIS — I73.00 RAYNAUD'S DISEASE WITHOUT GANGRENE: ICD-10-CM

## 2023-12-01 RX ORDER — AMLODIPINE BESYLATE 5 MG/1
5 TABLET ORAL DAILY
Qty: 90 TABLET | Refills: 1 | Status: SHIPPED | OUTPATIENT
Start: 2023-12-01 | End: 2024-05-29

## 2023-12-01 NOTE — TELEPHONE ENCOUNTER
Pharmacy requesting refill on Amlodipine 5mg  Pt's LOV 06/14/2023  Pt's NOV none scheduled  Medication pending

## 2024-02-12 NOTE — TELEPHONE ENCOUNTER
Patient last seen by provider in 6/2023 and directed to f/u in 3 months but did not. Also, no f/u appts scheduled to see provider. Routing refill to provider to see if she would like it refilled since over 6 months since last appointment

## 2024-02-18 RX ORDER — HYDROXYCHLOROQUINE SULFATE 200 MG/1
400 TABLET, FILM COATED ORAL DAILY
Qty: 180 TABLET | Refills: 1 | Status: SHIPPED | OUTPATIENT
Start: 2024-02-18 | End: 2024-08-16

## 2024-07-12 DIAGNOSIS — I73.00 RAYNAUD'S DISEASE WITHOUT GANGRENE: ICD-10-CM

## 2024-07-15 RX ORDER — AMLODIPINE BESYLATE 5 MG/1
5 TABLET ORAL DAILY
Qty: 90 TABLET | Refills: 1 | Status: SHIPPED | OUTPATIENT
Start: 2024-07-15 | End: 2025-01-11